# Patient Record
Sex: FEMALE | URBAN - METROPOLITAN AREA
[De-identification: names, ages, dates, MRNs, and addresses within clinical notes are randomized per-mention and may not be internally consistent; named-entity substitution may affect disease eponyms.]

---

## 2024-10-21 ENCOUNTER — ANNUAL EXAM (OUTPATIENT)
Dept: OBGYN CLINIC | Facility: CLINIC | Age: 44
End: 2024-10-21

## 2024-10-21 VITALS
HEART RATE: 72 BPM | BODY MASS INDEX: 26.93 KG/M2 | HEIGHT: 63 IN | SYSTOLIC BLOOD PRESSURE: 147 MMHG | DIASTOLIC BLOOD PRESSURE: 76 MMHG | WEIGHT: 152 LBS

## 2024-10-21 DIAGNOSIS — Z20.2 POSSIBLE EXPOSURE TO STD: ICD-10-CM

## 2024-10-21 DIAGNOSIS — Z60.3 LANGUAGE BARRIER: ICD-10-CM

## 2024-10-21 DIAGNOSIS — Z01.419 ENCOUNTER FOR GYNECOLOGICAL EXAMINATION WITHOUT ABNORMAL FINDING: Primary | ICD-10-CM

## 2024-10-21 DIAGNOSIS — Z75.8 LANGUAGE BARRIER: ICD-10-CM

## 2024-10-21 DIAGNOSIS — Z12.31 ENCOUNTER FOR SCREENING MAMMOGRAM FOR MALIGNANT NEOPLASM OF BREAST: ICD-10-CM

## 2024-10-21 PROCEDURE — G0476 HPV COMBO ASSAY CA SCREEN: HCPCS | Performed by: NURSE PRACTITIONER

## 2024-10-21 PROCEDURE — 99386 PREV VISIT NEW AGE 40-64: CPT | Performed by: NURSE PRACTITIONER

## 2024-10-21 PROCEDURE — G0145 SCR C/V CYTO,THINLAYER,RESCR: HCPCS | Performed by: NURSE PRACTITIONER

## 2024-10-21 PROCEDURE — 87491 CHLMYD TRACH DNA AMP PROBE: CPT | Performed by: NURSE PRACTITIONER

## 2024-10-21 PROCEDURE — 87591 N.GONORRHOEAE DNA AMP PROB: CPT | Performed by: NURSE PRACTITIONER

## 2024-10-21 RX ORDER — MELOXICAM 7.5 MG/1
TABLET ORAL
COMMUNITY
Start: 2024-08-12

## 2024-10-21 RX ORDER — ESCITALOPRAM OXALATE 20 MG/1
1 TABLET ORAL DAILY
COMMUNITY
Start: 2024-07-20

## 2024-10-21 RX ORDER — TRAZODONE HYDROCHLORIDE 50 MG/1
1 TABLET, FILM COATED ORAL DAILY
COMMUNITY
Start: 2024-07-20

## 2024-10-21 RX ORDER — LOSARTAN POTASSIUM AND HYDROCHLOROTHIAZIDE 12.5; 5 MG/1; MG/1
1 TABLET ORAL DAILY
COMMUNITY
Start: 2024-07-20

## 2024-10-21 SDOH — SOCIAL STABILITY - SOCIAL INSECURITY: ACCULTURATION DIFFICULTY: Z60.3

## 2024-10-21 NOTE — PROGRESS NOTES
ASSESSMENT & PLAN: Katalina Ruiz is a 44 y.o.  with normal gynecologic exam.    1.  Routine well woman exam done today  2.  Pap and HPV:  The patient's last pap and hpv was - pt reports has not had a pap smear. Has never had while in [place]    Pap and cotesting was done today.    Current ASCCP Guidelines reviewed.   3.  Mammogram ordered, to be done through [facility].   4. The following were reviewed in today's visit: breast self exam, mammography screening ordered, STD testing, menopause, adequate intake of calcium and vitamin D, exercise, and healthy diet.  Reviewed menopause as no bleeding for 1 full year, reviewed bleeding precautions with patient.   5.  Patient needs to establish a PCP, information provided on [facility], reviewed inquiry for FIT testing,  Declines colonoscopy due to lack of insurance    BMI Counseling: Body mass index is 27.27 kg/m². The BMI is above normal. Nutrition recommendations include encouraging healthy choices of fruits and vegetables and moderation in carbohydrate intake. Exercise recommendations include exercising 3-5 times per week. Rationale for BMI follow-up plan is due to patient being overweight or obese.     Depression Screening and Follow-up Plan: Patient was screened for depression during today's encounter. They screened negative with a PHQ-2 score of 2.        CC:  Annual Gynecologic Examination    HPI: Katalina Ruiz is a 44 y.o.  who presents for annual gynecologic examination.   used [id number] she is a new patient to us.   Last MMG was 2 years ago  and was negative per pt. Has never had a pap.  Her last full menses was 1 year ago and gets light spotting every 3 months, she denies any cramps.        Health Maintenance:    She wears her seatbelt routinely.    She does perform regular monthly self breast exams.    She feels safe at home.     Patient Active Problem List   Diagnosis    Encounter for gynecological examination  without abnormal finding       Past Medical History:   Diagnosis Date    Depression     Hypertension        History reviewed. No pertinent surgical history.    Past OB/Gyn History:  OB History          3    Para   1    Term           1    AB   2    Living   1         SAB        IAB   2    Ectopic        Multiple        Live Births   1                  Pt does not have menstrual issues. Has light spotting every 3 months.  History of sexually transmitted infection: No.  History of abnormal pap smears: No 1st pap done today.    Patient is currently sexually active.  heterosexual. The current method of family planning is none.    Family History   Problem Relation Age of Onset    Skin cancer Mother     No Known Problems Father     Skin cancer Sister     Tongue cancer Maternal Grandmother     Skin cancer Niece     Breast cancer Neg Hx     Colon cancer Neg Hx     Ovarian cancer Neg Hx        Social History:    .  Patient is single.  Patient is currently employed   No Known Allergies    Current Outpatient Medications:     escitalopram (LEXAPRO) 20 mg tablet, Take 1 tablet by mouth in the morning, Disp: , Rfl:     losartan-hydrochlorothiazide (HYZAAR) 50-12.5 mg per tablet, Take 1 tablet by mouth in the morning, Disp: , Rfl:     traZODone (DESYREL) 50 mg tablet, Take 1 tablet by mouth in the morning, Disp: , Rfl:     meloxicam (MOBIC) 7.5 mg tablet, TAKE 1 TABLET EVERY DAY BY ORAL ROUTE AS NEEDED. (Patient not taking: Reported on 10/21/2024), Disp: , Rfl:     Review of Systems:    Review of Systems  Constitutional :no fever, feels well, no tiredness, no recent weight gain or loss  ENT: no ear ache, no loss of hearing, no nosebleeds or nasal discharge, no sore throat or hoarseness.  Cardiovascular: no complaints of slow or fast heart beat, no chest pain, no palpitations, no leg claudication or lower extremity edema.  Respiratory: no complaints of shortness of shortness of breath, no ANDRADE  Breasts:no complaints of  "breast pain, breast lump, or nipple discharge  Gastrointestinal: no complaints of abdominal pain, constipation, nausea, vomiting, or diarrhea or bloody stools  Genitourinary : no complaints of dysuria, incontinence, pelvic pain, no dysmenorrhea, vaginal discharge or abnormal vaginal bleeding and as noted in HPI.  Musculoskeletal: no complaints of arthralgia, no myalgia, no joint swelling or stiffness, no limb pain or swelling.  Integumentary: no complaints of skin rash or lesion, itching or dry skin  Neurological: no complaints of headache, no confusion, no numbness or tingling, no dizziness or fainting    Objective      /76   Pulse 72   Ht 5' 2.6\" (1.59 m)   Wt 68.9 kg (152 lb)   BMI 27.27 kg/m²     General:   appears stated age, cooperative, alert normal mood and affect   Neck: normal, supple,trachea midline, no masses   Heart: regular rate and rhythm, S1, S2 normal, no murmur, click, rub or gallop   Lungs: clear to auscultation bilaterally   Breasts: normal appearance, no masses or tenderness, Inspection negative, No nipple retraction or dimpling, No nipple discharge or bleeding, No axillary or supraclavicular adenopathy, Normal to palpation without dominant masses, Taught monthly breast self examination   Abdomen: soft, non-tender, without masses or organomegaly   Vulva: normal female genitalia, Bartholin's, Urethra, [place] normal   Vagina: normal vagina, no discharge, exudate, lesion, or erythema   Urethra: normal   Cervix: PAP done. Friable. GCC done. Nontender.   Uterus: normal size, contour, position, consistency, mobility, non-tender and anteverted   Adnexa: normal adnexa and no mass, fullness, tenderness   Lymphatic palpation of lymph nodes in neck, axilla, groin and/or other locations: no lymphadenopathy or masses noted   Skin normal skin turgor and no rashes.   Psychiatric orientation to person, place, and time: normal. mood and affect: normal         "

## 2024-10-22 ENCOUNTER — TELEPHONE (OUTPATIENT)
Dept: OBGYN CLINIC | Facility: CLINIC | Age: 44
End: 2024-10-22

## 2024-10-22 LAB
C TRACH DNA SPEC QL NAA+PROBE: NEGATIVE
HPV HR 12 DNA CVX QL NAA+PROBE: NEGATIVE
HPV16 DNA CVX QL NAA+PROBE: NEGATIVE
HPV18 DNA CVX QL NAA+PROBE: NEGATIVE
N GONORRHOEA DNA SPEC QL NAA+PROBE: NEGATIVE

## 2024-10-22 NOTE — TELEPHONE ENCOUNTER
----- Message from KAREN Mcdonald sent at 10/22/2024  4:16 PM EDT -----  Please inform pt that her culture for chlamydia and gonorrhea were negative.  Thank You!

## 2024-10-25 LAB
LAB AP GYN PRIMARY INTERPRETATION: NORMAL
Lab: NORMAL

## 2024-10-31 ENCOUNTER — TELEPHONE (OUTPATIENT)
Dept: OBGYN CLINIC | Facility: CLINIC | Age: 44
End: 2024-10-31

## 2024-10-31 NOTE — TELEPHONE ENCOUNTER
----- Message from KAREN Mcdonald sent at 10/28/2024  1:01 PM EDT -----  Please inform pt that her pap and HPV were both negative.  Thank You.

## 2024-11-04 ENCOUNTER — TELEPHONE (OUTPATIENT)
Dept: OTHER | Facility: OTHER | Age: 44
End: 2024-11-04

## 2024-11-04 NOTE — TELEPHONE ENCOUNTER
Progress Note:  Patient scheduled for mammo 11/26/24. Referred to Health Advocacy Team for possible assistance with coverage.      Mammogram Screening ([facility]):  Pap smear screening ([facility]inic vs [facility] vs [facility]):  PCP ([facility]inic vs [facility] vs [facility]):     Transportation:     Education:

## 2024-11-05 ENCOUNTER — PATIENT OUTREACH (OUTPATIENT)
Facility: HOSPITAL | Age: 44
End: 2024-11-05

## 2024-11-20 PROBLEM — Z01.419 ENCOUNTER FOR GYNECOLOGICAL EXAMINATION WITHOUT ABNORMAL FINDING: Status: RESOLVED | Noted: 2024-10-21 | Resolved: 2024-11-20

## 2024-11-26 ENCOUNTER — HOSPITAL ENCOUNTER (OUTPATIENT)
Facility: HOSPITAL | Age: 44
Discharge: HOME | End: 2024-11-26
Payer: OTHER GOVERNMENT

## 2024-11-26 VITALS — HEIGHT: 63 IN | WEIGHT: 152 LBS | BODY MASS INDEX: 26.93 KG/M2

## 2024-11-26 DIAGNOSIS — Z12.31 ENCOUNTER FOR SCREENING MAMMOGRAM FOR MALIGNANT NEOPLASM OF BREAST: ICD-10-CM

## 2024-11-26 PROCEDURE — 77067 SCR MAMMO BI INCL CAD: CPT

## 2024-11-26 PROCEDURE — 77063 BREAST TOMOSYNTHESIS BI: CPT

## 2024-12-09 ENCOUNTER — RESULTS FOLLOW-UP (OUTPATIENT)
Dept: OBGYN CLINIC | Facility: CLINIC | Age: 44
End: 2024-12-09

## 2024-12-09 NOTE — TELEPHONE ENCOUNTER
Called pt and lvm w/ office number for c/b    ----- Message from KAREN Mcdonald sent at 12/9/2024  7:52 AM EST -----  Please call patient to inform that her mammogram showed calcifications in both breasts and she needs additional imaging, needs a bilateral diagnostic mammogram that has been ordered but needs to be scheduled, if she does not hear from a breast health care nurse  the patient can call  to schedule.  Thank you

## 2024-12-11 ENCOUNTER — TELEPHONE (OUTPATIENT)
Dept: OBGYN CLINIC | Facility: CLINIC | Age: 44
End: 2024-12-11

## 2024-12-11 NOTE — TELEPHONE ENCOUNTER
Pt returning office call. Discussed needs for additional imaging. Pt verbalizes understanding. Advised if she does not hear from breast center by next week to call scheduling to get an appt, number provided to patient.     ----- Message from KAREN Mcdonald sent at 12/9/2024  7:52 AM EST -----  Please call patient to inform that her mammogram showed calcifications in both breasts and she needs additional imaging, needs a bilateral diagnostic mammogram that has been ordered but needs to be scheduled, if she does not hear from a breast health care nurse  the patient can call  to schedule.  Thank you